# Patient Record
Sex: FEMALE | Race: WHITE
[De-identification: names, ages, dates, MRNs, and addresses within clinical notes are randomized per-mention and may not be internally consistent; named-entity substitution may affect disease eponyms.]

---

## 2017-01-12 ENCOUNTER — HOSPITAL ENCOUNTER (EMERGENCY)
Dept: HOSPITAL 10 - FTE | Age: 32
LOS: 1 days | Discharge: HOME | End: 2017-01-13
Payer: COMMERCIAL

## 2017-01-12 VITALS
HEIGHT: 68 IN | WEIGHT: 208.34 LBS | BODY MASS INDEX: 31.57 KG/M2 | WEIGHT: 208.34 LBS | HEIGHT: 68 IN | BODY MASS INDEX: 31.57 KG/M2

## 2017-01-12 DIAGNOSIS — Z85.850: ICD-10-CM

## 2017-01-12 DIAGNOSIS — E03.9: ICD-10-CM

## 2017-01-12 DIAGNOSIS — R10.13: Primary | ICD-10-CM

## 2017-01-12 DIAGNOSIS — R11.2: ICD-10-CM

## 2017-01-12 LAB
ALBUMIN SERPL-MCNC: 4.5 G/DL (ref 3.3–4.9)
ALBUMIN/GLOB SERPL: 1.36 {RATIO}
ALP SERPL-CCNC: 76 IU/L (ref 42–121)
ALT SERPL-CCNC: 146 IU/L (ref 13–69)
ANION GAP SERPL CALC-SCNC: 17 MMOL/L (ref 8–16)
APTT BLD: 30.9 SEC (ref 25–35)
AST SERPL-CCNC: 125 IU/L (ref 15–46)
BASOPHILS # BLD AUTO: 0 10^3/UL (ref 0–0.1)
BASOPHILS NFR BLD: 0.4 % (ref 0–2)
BILIRUB DIRECT SERPL-MCNC: 0 MG/DL (ref 0–0.2)
BILIRUB SERPL-MCNC: 1.3 MG/DL (ref 0.2–1.3)
BUN SERPL-MCNC: 12 MG/DL (ref 7–20)
CALCIUM SERPL-MCNC: 9.1 MG/DL (ref 8.4–10.2)
CHLORIDE SERPL-SCNC: 102 MMOL/L (ref 97–110)
CO2 SERPL-SCNC: 27 MMOL/L (ref 21–31)
CONDITION: 1
CREAT SERPL-MCNC: 1.05 MG/DL (ref 0.44–1)
EOSINOPHIL # BLD: 0.2 10^3/UL (ref 0–0.5)
EOSINOPHIL NFR BLD: 2.2 % (ref 0–7)
ERYTHROCYTE [DISTWIDTH] IN BLOOD BY AUTOMATED COUNT: 13.5 % (ref 11.5–14.5)
GLOBULIN SER-MCNC: 3.3 G/DL (ref 1.3–3.2)
GLUCOSE SERPL-MCNC: 89 MG/DL (ref 70–220)
HCT VFR BLD CALC: 40.7 % (ref 37–47)
HGB BLD-MCNC: 13.8 G/DL (ref 12–16)
INR PPP: 0.95
LYMPHOCYTES # BLD AUTO: 1.5 10^3/UL (ref 0.8–2.9)
LYMPHOCYTES NFR BLD AUTO: 21.6 % (ref 15–51)
MCH RBC QN AUTO: 32.1 PG (ref 29–33)
MCHC RBC AUTO-ENTMCNC: 33.9 G/DL (ref 32–37)
MCV RBC AUTO: 94.8 FL (ref 82–101)
MONOCYTES # BLD: 0.3 10^3/UL (ref 0.3–0.9)
MONOCYTES NFR BLD: 4.3 % (ref 0–11)
NEUTROPHILS # BLD: 5 10^3/UL (ref 1.6–7.5)
NEUTROPHILS NFR BLD AUTO: 71.5 % (ref 39–77)
NRBC # BLD MANUAL: 0 10^3/UL (ref 0–0)
NRBC BLD QL: 0 /100WBC (ref 0–0)
PLATELET # BLD: 285 10^3/UL (ref 140–440)
PMV BLD AUTO: 10 FL (ref 7.4–10.4)
POTASSIUM SERPL-SCNC: 3.8 MMOL/L (ref 3.5–5.1)
PROT SERPL-MCNC: 7.8 G/DL (ref 6.1–8.1)
PROTHROMBIN TIME: 12.7 SEC (ref 12.2–14.2)
PT RATIO: 1
RBC # BLD AUTO: 4.3 10^6/UL (ref 4.2–5.4)
SODIUM SERPL-SCNC: 142 MMOL/L (ref 135–144)
WBC # BLD AUTO: 7 10^3/UL (ref 4.8–10.8)
WBC # BLD: 7 10^3/UL (ref 4.8–10.8)

## 2017-01-12 PROCEDURE — 96374 THER/PROPH/DIAG INJ IV PUSH: CPT

## 2017-01-12 PROCEDURE — 83690 ASSAY OF LIPASE: CPT

## 2017-01-12 PROCEDURE — 84443 ASSAY THYROID STIM HORMONE: CPT

## 2017-01-12 PROCEDURE — 76705 ECHO EXAM OF ABDOMEN: CPT

## 2017-01-12 PROCEDURE — 81003 URINALYSIS AUTO W/O SCOPE: CPT

## 2017-01-12 PROCEDURE — 85730 THROMBOPLASTIN TIME PARTIAL: CPT

## 2017-01-12 PROCEDURE — 87086 URINE CULTURE/COLONY COUNT: CPT

## 2017-01-12 PROCEDURE — 81001 URINALYSIS AUTO W/SCOPE: CPT

## 2017-01-12 PROCEDURE — 84439 ASSAY OF FREE THYROXINE: CPT

## 2017-01-12 PROCEDURE — 96376 TX/PRO/DX INJ SAME DRUG ADON: CPT

## 2017-01-12 PROCEDURE — 36415 COLL VENOUS BLD VENIPUNCTURE: CPT

## 2017-01-12 PROCEDURE — 96361 HYDRATE IV INFUSION ADD-ON: CPT

## 2017-01-12 PROCEDURE — 85025 COMPLETE CBC W/AUTO DIFF WBC: CPT

## 2017-01-12 PROCEDURE — 96375 TX/PRO/DX INJ NEW DRUG ADDON: CPT

## 2017-01-12 PROCEDURE — 85610 PROTHROMBIN TIME: CPT

## 2017-01-12 PROCEDURE — 80053 COMPREHEN METABOLIC PANEL: CPT

## 2017-01-12 NOTE — ERD
ER Documentation


Chief Complaint


Date/Time


DATE: 1/12/17


Chief Complaint


Abdominal pain s/p food intake





HPI


The patient is a 31-year-old female who presents to the Emergency Department 

with complaint of epigastric abdominal pain, nausea and vomiting.  The patient 

reports that at 1:00 pm this afternoon, she ate lunch.  Shortly afterwards she 

noted onset of gradual-in-onset, now constant epigastric abdominal pain, which 

she currently rates as 5/10.  She reports associated burping, nausea and one 

episode of non-bilious, non-bloody emesis.  She denies any diarrhea. Denies 

black or bloody stools.  Denies vaginal bleeding or new vaginal discharge.  

Denies dysuria, hematuria, flank pain.  She has noted that over the past week 

after eating foods she has been burping more frequently, with associated 

nausea. 





Of note, the patient has a history of papillary thyroid carcinoma, and 

underwent complete thyroidectomy on 9/13/2016.  During her surgery, she was 

noted to have an affected lymph node, which was biopsied and removed.  Since, 

she has not been on any thyroid replacement therapy or synthroid.  This is 

secondary to the recommendation of the patient's thyroid specialist, as he 

plans to order a special study to confirm no spread of the patient's cancer.  

She was evaluated in the ED in 12/2016 for symptoms associated with her 

hypothyroid state, and was seen by an endocrinologist.  Patient has an 

appointment to follow up with her specialist this Monday for further evaluation 

and for the study to be performed.





ROS


All systems reviewed and are negative except as per history of present illness.





Medications


Home Meds


Active Scripts


Pantoprazole* (Protonix*) 40 Mg Tablet.dr, 40 MG PO BID, #20 TAB


   Prov:QUIN MIRAMONTES PA-C         1/13/17


Prochlorperazine* (Prochlorperazine*) 10 Mg Tablet, 5 MG PO Q6 Y for NAUSEA AND/

OR VOMITING, #12 TAB


   Prov:QUIN MIRAMONTES PA-C         1/13/17





Allergies


Allergies:  


Coded Allergies:  


     meperidine HCl (Verified  Allergy, Unknown, 9/13/16)


     metoclopramide HCl (Verified  Allergy, Unknown, 9/13/16)


     nickel (Verified  Allergy, Unknown, 12/28/16)





PMhx/Soc


History of Surgery:  Yes (CHOLECYSTECTOMY, left (benign) LUMPECTOMY,BTL, 

THYROIDECTOMY )


Anesthesia Reaction:  No


Hx Neurological Disorder:  No


Hx Respiratory Disorders:  No


Hx Cardiac Disorders:  No


Hx Psychiatric Problems:  Yes (DEPRESSION)


Hx Miscellaneous Medical Probl:  Yes (THYROID CA)


Hx Alcohol Use:  No


Hx Substance Use:  No


Hx Tobacco Use:  No


Smoking Status:  Never smoker





Physical Exam


Vitals





Vital Signs








  Date Time  Temp Pulse Resp B/P Pulse Ox O2 Delivery O2 Flow Rate FiO2


 


1/13/17 02:49   18 95/77 99 Room Air  


 


1/13/17 01:50 96.5 76 18 92/68 100 Room Air  


 


1/13/17 00:20 96.7 69 18 98/62 98 Room Air  


 


1/12/17 19:33 97.9 68 20 109/83 100   








Physical Exam


GENERAL: Well-developed, well-nourished, in no acute distress


HEENT: Head is normocephalic, atraumatic. No scleral pallor or icterus. Pupils 

equal, round and reactive to light. Conjunctiva pink. Moist mucous membranes.


NECK: Supple. No masses, no tenderness, no lymphadenopathy. Post-surgical scar 

noted. 


RESPIRATORY: Lungs are clear to auscultation bilaterally. No rales, rhonchi or 

wheezing.  Equal breath sounds.  Normal expiratory effort. 


CARDIOVASCULAR:  Regular rate and rhythm. S1 and S2 normal. No murmurs, rubs, 

or gallops.


GASTROINTESTINAL: Abdomen is soft and nondistended. Minimal tenderness to 

palpation over the epigastric region. No guarding, no rebound tenderness. 

Normal bowel sounds. No abdominal bruits. No gross peritonitis. No masses or 

organomegaly.  Negative Hoskins's sign. No tenderness at McBurney's point. 


FLANK: No CVA tenderness, no mass or swelling.


EXTREMITIES: No clubbing, cyanosis, or edema. Normal skin perfusion. Moving all 

extremities.  No focal swelling or erythema. Distal pulses are palpable, 2+ 

bilaterally. Capillary refill is less than 2 seconds.


NEUROLOGIC: The patient is alert, awake, and oriented x 3. No focal neurologic 

deficits. Speech is normal. 


INTEGUMENT:  Skin is clean, dry and intact. No rashes.


PSYCHIATRIC:  Appropriate; Cooperative.


Result Diagram:  


1/12/17 2100                                                                   

             1/12/17 2100





Results 24 hrs





 Laboratory Tests








Test


  1/12/17


21:00 1/12/17


23:28


 


Activated Partial


Thromboplast Time 30.9Sec 


  


 


 


Alanine Aminotransferase


(ALT/SGPT) 146IU/L 


  


 


 


Albumin 4.5g/dl  


 


Albumin/Globulin Ratio 1.36  


 


Alkaline Phosphatase 76IU/L  


 


Anion Gap 17  


 


Aspartate Amino Transf


(AST/SGOT) 125IU/L 


  


 


 


Basophils # 0.010^3/ul  


 


Basophils % 0.4%  


 


Blood Urea Nitrogen 12mg/dl  


 


Calcium Level 9.1mg/dl  


 


Carbon Dioxide Level 27mmol/L  


 


Chloride Level 102mmol/L  


 


Creatinine 1.05mg/dl  


 


Direct Bilirubin 0.00mg/dl  


 


Eosinophils # 0.210^3/ul  


 


Eosinophils % 2.2%  


 


Free Thyroxine < 0.07ng/dl  


 


Globulin 3.30g/dl  


 


Glucose Level 89mg/dl  


 


Hematocrit 40.7%  


 


Hemoglobin 13.8g/dl  


 


INR International Normalized


Ratio 0.95 


  


 


 


Indirect Bilirubin 1.3mg/dl  


 


Lipase 78U/L  


 


Lymphocytes # 1.510^3/ul  


 


Lymphocytes % 21.6%  


 


Mean Corpuscular Hemoglobin 32.1pg  


 


Mean Corpuscular Hemoglobin


Concent 33.9g/dl 


  


 


 


Mean Corpuscular Volume 94.8fl  


 


Mean Platelet Volume 10.0fl  


 


Monocytes # 0.310^3/ul  


 


Monocytes % 4.3%  


 


Neutrophils # 5.010^3/ul  


 


Neutrophils % 71.5%  


 


Nucleated Red Blood Cells # 0.010^3/ul  


 


Nucleated Red Blood Cells % 0.0/100WBC  


 


Platelet Count 39455^3/UL  


 


Potassium Level 3.8mmol/L  


 


Prothrombin Time 12.7Sec  


 


Prothrombin Time Ratio 1.0  


 


Red Blood Count 4.3010^6/ul  


 


Red Cell Distribution Width 13.5%  


 


Sodium Level 142mmol/L  


 


Thyroid Stimulating Hormone


(TSH) 171.000MIU/L 


  


 


 


Total Bilirubin 1.3mg/dl  


 


Total Protein 7.8g/dl  


 


White Blood Count 7.010^3/ul  


 


Urine Bacteria  OCCASIONAL 


 


Urine Bilirubin  NEGATIVE 


 


Urine Clarity  CLEAR 


 


Urine Color  YELLOW 


 


Urine Glucose  NEGATIVE% 


 


Urine Hemoglobin  NEGATIVE 


 


Urine Ketones  NEGATIVE 


 


Urine Leukocyte Esterase  TRACE 


 


Urine Microscopic RBC  NONE SEEN/HPF 


 


Urine Microscopic WBC  0-2/HPF 


 


Urine Mucus  FEW 


 


Urine Nitrite  NEGATIVE 


 


Urine Specific Gravity  1.025 


 


Urine Squamous Epithelial


Cells 


  FEW 


 


 


Urine Total Protein  NEGATIVE 


 


Urine Urobilinogen  1.0 E.U./dL 


 


Urine pH  6.0 








 Current Medications








 Medications


  (Trade)  Dose


 Ordered  Sig/Esperanza


 Route


 PRN Reason  Start Time


 Stop Time Status Last Admin


Dose Admin


 


 Sodium Chloride


  (NS)  1,000 ml @ 


 1,000 mls/hr  Q1H STAT


 IV


   1/12/17 20:43


 1/12/17 21:42 DC 1/12/17 20:59


 


 


 Morphine Sulfate


  (morphine)  4 mg  ONCE  STAT


 IV


   1/12/17 20:43


 1/12/17 20:45 DC 1/12/17 20:59


 


 


 Ondansetron HCl


  (Zofran Inj)  4 mg  ONCE  STAT


 IV


   1/12/17 20:43


 1/12/17 20:45 DC 1/12/17 20:59


 


 


 Famotidine


  (Pepcid Iv)  20 mg  ONCE  ONCE


 IV


   1/12/17 21:00


 1/12/17 21:01 DC 1/12/17 20:59


 


 


 Ketorolac


 Tromethamine


  (Toradol)  30 mg  ONCE  STAT


 IV


   1/12/17 21:43


 1/12/17 21:44 DC 1/12/17 21:53


 


 


 Hydromorphone HCl


  (Dilaudid)  0.5 mg  ONCE  STAT


 IV


   1/12/17 23:17


 1/12/17 23:18 DC 1/12/17 23:25


 


 


 Ondansetron HCl


  (Zofran Inj)  4 mg  ONCE  STAT


 IV


   1/12/17 23:17


 1/12/17 23:18 DC 1/12/17 23:25


 


 


 Miscellaneous


 Medication


  (Gi Cocktail (2))  40 ml  ONCE  STAT


 PO


   1/13/17 00:08


 1/13/17 00:09 DC 1/13/17 00:16


 


 


 Prochlorperazine


 10 mg  10 mg  ONCE  ONCE


 IV


   1/13/17 01:30


 1/13/17 01:31 DC 1/13/17 01:39


 


 


 Sodium Chloride


  (NS)  500 ml @ 


 500 mls/hr  Q1H STAT


 IV


   1/13/17 01:51


 1/13/17 02:50 DC 1/13/17 01:56


 











Procedures/MDM


DIAGNOSTIC TESTS AND INTERPRETATION:


PROCEDURE:   US Abdomen. 


CLINICAL INDICATION:   abdominal pain 


TECHNIQUE:   Multiple real-time images were acquired of the patient's right 

upper quadrant abdomen and retroperitoneum utilizing a high resolution 

transducer. 


COMPARISON:   None 


FINDINGS:


The liver demonstrates normal echogenicity.  The liver is normal in size and no 

focal solid lesions are seen. The liver measures 16.3 cm in length. The portal 

vein is patent with normal direction of flow.  No intrahepatic biliary 

dilatation is seen. 


The patient is status post cholecystectomy. The common bile duct measures 5 mm 

in maximal dimension.  


The pancreas was not seen due to overlying bowel gas.


No free fluid is identified.


The right kidney is normal in size, and demonstrate normal echogenicity and 

cortical thickness.  The right kidney measures 10 cm in long dimension.  There 

is no evidence of hydronephrosis. There are no kidney stones.  


IMPRESSION:Status post cholecystectomy. Peristalsing bowel seen in the 

gallbladder fossa. Pancreas not visualized.


_____________________________________________ 


.Perfecto Klein MD, MD           Date    Time 


Electronically viewed and signed by .Perfecto Klein MD, MD on 01/12/2017 21:

36 





EMERGENCY DEPARTMENT COURSE: The patient was stable throughout the ED course. 

IV access established by nursing staff. Laboratory work and diagnostic imaging 

was performed. On reevaluation the patient reports decreased pain and symptoms. 





Discussed case with Dr. Newell, ED supervising physician, who evaluated the 

patient bedside.  Recommends against CT imaging at this time, as risks of 

radiation outweigh current possible benefits.  Additionally, patient has 

upcoming study involving radiation.  Discussed recommendation against CT 

imaging with the patient, who agrees with plan.  Dr. Newell recommends discharge 

home with rx for Compazine 5 mg PO q6hrs PRN nausea, and Protonix 40 mg PO BID 

PRN, and that patient follow up with GI specialist for endoscopy. Patient's 

thyroid levels consistent with that of prior. Patient will follow up with 

thyroid specialist on Monday to continue with planned scan and therapy. 





MEDICAL DECISION MAKING:  This is a 31-year-old female presenting to the 

Emergency Department with epigastric abdominal pain, nausea and vomiting which 

began after eating a meal at 1:00 am.  On physical examination, the patient had 

minimal tenderness to palpation over the epigastrium, but otherwise vital signs 

were stable.  Differential diagnosis includes, but is not limited to, 

gastroenteritis, gastritis, cholecystitis, cholangitis, choledocholithiasis, 

pancreatitis, perforated viscus, mesenteric ischemia, GERD, PUD, urinary tract 

infection, acute coronary syndrome, pyelonephritis, pneumonia, hepatitis, 

infectious diarrhea, IBD, aortic dissection, torsion, bowel obstruction, 

appendicitis, diverticulitis.  After rest and administration of fluids and 

medications, the patient reports no new complaints and decreased pain and 

symptoms.  





Upon review and interpretation of the patient's presentation and overall ER 

course, I believe the patient's symptoms are most consistent with epigastric 

abdominal pain, nausea and vomiting, uncertain etiology.  I doubt cholecystitis

, no abnormalities or indication of disease process noted on ultrasound, 

negative Hoskins's sign, and patient has history of cholecystectomy.  Patient's 

indirect bilirubin 1.3, , , however, no dilated CBD on ultrasound 

imaging, no RUQ tenderness, no leukocytosis, and therefore I doubt 

choledocholithiasis at this time. (This was discussed with Dr. Newell, who agrees

) Doubt acute coronary syndrome - symptoms and examination inconsistent.  Doubt 

pancreatitis - clinical presentation inconsistent.  Doubt perforated ulcer, 

patient has a non-surgical abdomen.  Doubt small bowel obstruction, patient is 

passing flatus, abdomen is non-distended.  Doubt appendicitis, patient has no 

McBurney's point tenderness, no guarding, non-surgical abdomen, no tenderness 

over the RLQ.  Doubt diverticulitis, exam inconsistent.  Doubt ischemic bowel, 

no pain out of proportion to examination.  Doubt torsion, symptoms and 

examination inconsistent.





At this time, the patient is in stable condition with stable vital signs and 

therefore can be discharged home with prescriptions for Compazine and Protonix, 

and strict return precautions for signs of deteriorating or worsening 

condition.  





The patient is noted to be significantly hypothyroid, with , T4 <0.07, 

though consistent with values of previous presentation.  This status is already 

known to patient, and she has an upcoming appointment with her thyroid 

specialist on Monday for further evaluation.  





She is advised to follow up with her primary care provider/endocrinologist and 

a GI specialist in 2-3 days for reevaluation and further management, or return 

to the ER sooner if symptoms worsen.  I shared my medical decision making, plan

, as well as the results with the patient at length and in great detail, and 

she verbally understands and agrees with the plan for further observation and 

care as an outpatient.  At the time of discharge, all questions were answered.





Departure


Diagnosis:  


 Primary Impression:  


 Epigastric abdominal pain


 Additional Impressions:  


 Nausea and vomiting


 Vomiting type:  unspecified  Vomiting Intractability:  non-intractable  

Qualified Code:  R11.2 - Non-intractable vomiting with nausea, unspecified 

vomiting type


 Hypothyroidism


 Hypothyroidism type:  unspecified  Qualified Code:  E03.9 - Hypothyroidism, 

unspecified type


Condition:  Stable


Patient Instructions:  Epigastric Pain (Uncertain Cause), Nausea and Vomiting-

Adult





Additional Instructions:  


Follow up with your primary medical provider within 2-3 days for reevaluation 

and further management.  You will also need to see your thyroid specialist, 

endocrinologist and a GI specialist (gastroenterologist) for further 

evaluation. Return to the ED sooner for any new or worsening symptoms.











QUIN MIRAMONTES PA-C Jan 12, 2017 21:45

## 2017-01-12 NOTE — RADRPT
PROCEDURE:   US Abdomen. 

 

CLINICAL INDICATION:   abdominal pain 

 

TECHNIQUE:   Multiple real-time images were acquired of the patient's right upper quadrant abdomen a
nd retroperitoneum utilizing a high resolution transducer. 

 

COMPARISON:   None 

 

FINDINGS:

 

The liver demonstrates normal echogenicity.  The liver is normal in size and no focal solid lesions 
are seen. The liver measures 16.3 cm in length. The portal vein is patent with normal direction of f
low.  No intrahepatic biliary dilatation is seen. 

 

The patient is status post cholecystectomy. The common bile duct measures 5 mm in maximal dimension.
  

 

The pancreas was not seen due to overlying bowel gas.

 

No free fluid is identified.

 

The right kidney is normal in size, and demonstrate normal echogenicity and cortical thickness.  The
 right kidney measures 10 cm in long dimension.  There is no evidence of hydronephrosis. There are n
o kidney stones.  

 

 

RPTAT: AA

 

IMPRESSION:

 

Status post cholecystectomy.

Peristalsing bowel seen in the gallbladder fossa.

Pancreas not visualized.

_____________________________________________ 

.Perfecto Klein MD, MD           Date    Time 

Electronically viewed and signed by .Perfecto Klein MD, MD on 01/12/2017 21:36 

 

D:  01/12/2017 21:36  T:  01/12/2017 21:36

.S/

## 2017-01-13 VITALS — RESPIRATION RATE: 18 BRPM | SYSTOLIC BLOOD PRESSURE: 95 MMHG | DIASTOLIC BLOOD PRESSURE: 77 MMHG

## 2017-01-13 VITALS — TEMPERATURE: 96.5 F | HEART RATE: 76 BPM

## 2017-01-13 LAB
ADD UMIC: YES
BACTERIA #/AREA URNS HPF: (no result) /[HPF]
COLOR UR: YELLOW
GLUCOSE UR STRIP-MCNC: NEGATIVE %
KETONES UR STRIP.AUTO-MCNC: NEGATIVE MG/DL
MUCOUS THREADS #/AREA URNS HPF: (no result) /[HPF]
NITRITE UR QL STRIP.AUTO: NEGATIVE
RBC # UR AUTO: NEGATIVE /UL
RBC #/AREA URNS HPF: (no result) /HPF
SQUAMOUS #/AREA URNS HPF: (no result) /[HPF]
URINE BILIRUBIN (DIP): NEGATIVE
URINE TOTAL PROTEIN (DIP): NEGATIVE
UROBILINOGEN UR STRIP-ACNC: (no result) (ref 0.1–1)
WBC # UR STRIP: (no result) /UL

## 2017-07-12 ENCOUNTER — HOSPITAL ENCOUNTER (INPATIENT)
Dept: HOSPITAL 10 - MS1 | Age: 32
LOS: 2 days | Discharge: HOME | DRG: 603 | End: 2017-07-14
Attending: FAMILY MEDICINE | Admitting: FAMILY MEDICINE
Payer: COMMERCIAL

## 2017-07-12 VITALS
HEIGHT: 69 IN | WEIGHT: 209.44 LBS | WEIGHT: 209.44 LBS | HEIGHT: 69 IN | BODY MASS INDEX: 31.02 KG/M2 | BODY MASS INDEX: 31.02 KG/M2

## 2017-07-12 VITALS — SYSTOLIC BLOOD PRESSURE: 131 MMHG | DIASTOLIC BLOOD PRESSURE: 74 MMHG | RESPIRATION RATE: 19 BRPM

## 2017-07-12 DIAGNOSIS — L03.221: Primary | ICD-10-CM

## 2017-07-12 DIAGNOSIS — Z90.49: ICD-10-CM

## 2017-07-12 DIAGNOSIS — R59.0: ICD-10-CM

## 2017-07-12 DIAGNOSIS — Z98.51: ICD-10-CM

## 2017-07-12 DIAGNOSIS — E89.0: ICD-10-CM

## 2017-07-12 DIAGNOSIS — F32.9: ICD-10-CM

## 2017-07-12 DIAGNOSIS — Z85.850: ICD-10-CM

## 2017-07-12 DIAGNOSIS — E66.9: ICD-10-CM

## 2017-07-12 LAB
ADD SCAN DIFF: NO
ANION GAP SERPL CALC-SCNC: 9 MMOL/L (ref 8–16)
BASOPHILS # BLD AUTO: 0 10^3/UL (ref 0–0.1)
BASOPHILS NFR BLD: 0.6 % (ref 0–2)
BUN SERPL-MCNC: 14 MG/DL (ref 7–20)
CALCIUM SERPL-MCNC: 8.9 MG/DL (ref 8.4–10.2)
CHLORIDE SERPL-SCNC: 103 MMOL/L (ref 97–110)
CO2 SERPL-SCNC: 24 MMOL/L (ref 21–31)
CREAT SERPL-MCNC: 0.7 MG/DL (ref 0.44–1)
EOSINOPHIL # BLD: 0.2 10^3/UL (ref 0–0.5)
EOSINOPHIL NFR BLD: 2.9 % (ref 0–7)
ERYTHROCYTE [DISTWIDTH] IN BLOOD BY AUTOMATED COUNT: 11.5 % (ref 11.5–14.5)
GLUCOSE SERPL-MCNC: 92 MG/DL (ref 70–220)
HCT VFR BLD CALC: 34.7 % (ref 37–47)
HGB BLD-MCNC: 11.5 G/DL (ref 12–16)
LYMPHOCYTES # BLD AUTO: 1.2 10^3/UL (ref 0.8–2.9)
LYMPHOCYTES NFR BLD AUTO: 21.5 % (ref 15–51)
MCH RBC QN AUTO: 30.3 PG (ref 29–33)
MCHC RBC AUTO-ENTMCNC: 33.1 G/DL (ref 32–37)
MCV RBC AUTO: 91.3 FL (ref 82–101)
MONOCYTES # BLD: 0.8 10^3/UL (ref 0.3–0.9)
MONOCYTES NFR BLD: 14.5 % (ref 0–11)
NEUTROPHILS # BLD: 3.3 10^3/UL (ref 1.6–7.5)
NEUTROPHILS NFR BLD AUTO: 60.3 % (ref 39–77)
NRBC # BLD MANUAL: 0 10^3/UL (ref 0–0)
NRBC BLD QL: 0 /100WBC (ref 0–0)
PLATELET # BLD: 261 10^3/UL (ref 140–415)
PMV BLD AUTO: 10 FL (ref 7.4–10.4)
POTASSIUM SERPL-SCNC: 3.8 MMOL/L (ref 3.5–5.1)
RBC # BLD AUTO: 3.8 10^6/UL (ref 4.2–5.4)
SODIUM SERPL-SCNC: 132 MMOL/L (ref 135–144)
WBC # BLD AUTO: 5.4 10^3/UL (ref 4.8–10.8)

## 2017-07-12 PROCEDURE — 80048 BASIC METABOLIC PNL TOTAL CA: CPT

## 2017-07-12 PROCEDURE — 84481 FREE ASSAY (FT-3): CPT

## 2017-07-12 PROCEDURE — 85025 COMPLETE CBC W/AUTO DIFF WBC: CPT

## 2017-07-12 PROCEDURE — 84443 ASSAY THYROID STIM HORMONE: CPT

## 2017-07-12 PROCEDURE — 84432 ASSAY OF THYROGLOBULIN: CPT

## 2017-07-12 PROCEDURE — 80202 ASSAY OF VANCOMYCIN: CPT

## 2017-07-12 PROCEDURE — 84439 ASSAY OF FREE THYROXINE: CPT

## 2017-07-12 PROCEDURE — 87081 CULTURE SCREEN ONLY: CPT

## 2017-07-12 PROCEDURE — 86800 THYROGLOBULIN ANTIBODY: CPT

## 2017-07-12 PROCEDURE — 82533 TOTAL CORTISOL: CPT

## 2017-07-12 PROCEDURE — 80053 COMPREHEN METABOLIC PANEL: CPT

## 2017-07-12 PROCEDURE — C9113 INJ PANTOPRAZOLE SODIUM, VIA: HCPCS

## 2017-07-12 RX ADMIN — FOLIC ACID SCH MLS/HR: 5 INJECTION, SOLUTION INTRAMUSCULAR; INTRAVENOUS; SUBCUTANEOUS at 21:20

## 2017-07-13 VITALS — DIASTOLIC BLOOD PRESSURE: 68 MMHG | RESPIRATION RATE: 14 BRPM | SYSTOLIC BLOOD PRESSURE: 118 MMHG

## 2017-07-13 VITALS — RESPIRATION RATE: 20 BRPM | SYSTOLIC BLOOD PRESSURE: 121 MMHG | DIASTOLIC BLOOD PRESSURE: 70 MMHG

## 2017-07-13 LAB
ADD SCAN DIFF: NO
ALBUMIN SERPL-MCNC: 3.9 G/DL (ref 3.3–4.9)
ALBUMIN/GLOB SERPL: 1.69 {RATIO}
ALP SERPL-CCNC: 81 IU/L (ref 42–121)
ALT SERPL-CCNC: 53 IU/L (ref 13–69)
ANION GAP SERPL CALC-SCNC: 9 MMOL/L (ref 8–16)
AST SERPL-CCNC: 36 IU/L (ref 15–46)
BASOPHILS # BLD AUTO: 0 10^3/UL (ref 0–0.1)
BASOPHILS NFR BLD: 0.5 % (ref 0–2)
BILIRUB DIRECT SERPL-MCNC: 0 MG/DL (ref 0–0.2)
BILIRUB SERPL-MCNC: 0.5 MG/DL (ref 0.2–1.3)
BUN SERPL-MCNC: 10 MG/DL (ref 7–20)
CALCIUM SERPL-MCNC: 8.3 MG/DL (ref 8.4–10.2)
CHLORIDE SERPL-SCNC: 106 MMOL/L (ref 97–110)
CO2 SERPL-SCNC: 24 MMOL/L (ref 21–31)
CREAT SERPL-MCNC: 0.76 MG/DL (ref 0.44–1)
EOSINOPHIL # BLD: 0.1 10^3/UL (ref 0–0.5)
EOSINOPHIL NFR BLD: 2.4 % (ref 0–7)
ERYTHROCYTE [DISTWIDTH] IN BLOOD BY AUTOMATED COUNT: 11.4 % (ref 11.5–14.5)
GLOBULIN SER-MCNC: 2.3 G/DL (ref 1.3–3.2)
GLUCOSE SERPL-MCNC: 92 MG/DL (ref 70–220)
HCT VFR BLD CALC: 33.9 % (ref 37–47)
HGB BLD-MCNC: 11.1 G/DL (ref 12–16)
LYMPHOCYTES # BLD AUTO: 1 10^3/UL (ref 0.8–2.9)
LYMPHOCYTES NFR BLD AUTO: 16.4 % (ref 15–51)
MCH RBC QN AUTO: 29.9 PG (ref 29–33)
MCHC RBC AUTO-ENTMCNC: 32.7 G/DL (ref 32–37)
MCV RBC AUTO: 91.4 FL (ref 82–101)
MONOCYTES # BLD: 0.5 10^3/UL (ref 0.3–0.9)
MONOCYTES NFR BLD: 9.1 % (ref 0–11)
NEUTROPHILS # BLD: 4.2 10^3/UL (ref 1.6–7.5)
NEUTROPHILS NFR BLD AUTO: 71.4 % (ref 39–77)
NRBC # BLD MANUAL: 0 10^3/UL (ref 0–0)
NRBC BLD QL: 0 /100WBC (ref 0–0)
PLATELET # BLD: 256 10^3/UL (ref 140–415)
PMV BLD AUTO: 10.3 FL (ref 7.4–10.4)
POTASSIUM SERPL-SCNC: 4.1 MMOL/L (ref 3.5–5.1)
PROT SERPL-MCNC: 6.2 G/DL (ref 6.1–8.1)
RBC # BLD AUTO: 3.71 10^6/UL (ref 4.2–5.4)
SODIUM SERPL-SCNC: 135 MMOL/L (ref 135–144)
T3FREE SERPL-MCNC: 3.41 PG/ML (ref 2.77–5.27)
TSH SERPL-ACNC: 0.03 MIU/L (ref 0.47–4.68)
WBC # BLD AUTO: 5.9 10^3/UL (ref 4.8–10.8)

## 2017-07-13 RX ADMIN — AMPICILLIN SODIUM AND SULBACTAM SODIUM SCH MLS/HR: 1; .5 INJECTION, POWDER, FOR SOLUTION INTRAMUSCULAR; INTRAVENOUS at 05:36

## 2017-07-13 RX ADMIN — VANCOMYCIN HYDROCHLORIDE SCH MLS/HR: 1 INJECTION, POWDER, LYOPHILIZED, FOR SOLUTION INTRAVENOUS at 18:06

## 2017-07-13 RX ADMIN — KETOROLAC TROMETHAMINE PRN MG: 15 INJECTION, SOLUTION INTRAMUSCULAR; INTRAVENOUS at 00:17

## 2017-07-13 RX ADMIN — FOLIC ACID SCH MLS/HR: 5 INJECTION, SOLUTION INTRAMUSCULAR; INTRAVENOUS; SUBCUTANEOUS at 06:41

## 2017-07-13 RX ADMIN — KETOROLAC TROMETHAMINE PRN MG: 15 INJECTION, SOLUTION INTRAMUSCULAR; INTRAVENOUS at 10:44

## 2017-07-13 RX ADMIN — AMPICILLIN SODIUM AND SULBACTAM SODIUM SCH MLS/HR: 1; .5 INJECTION, POWDER, FOR SOLUTION INTRAMUSCULAR; INTRAVENOUS at 01:05

## 2017-07-13 RX ADMIN — AMPICILLIN SODIUM AND SULBACTAM SODIUM SCH MLS/HR: 1; .5 INJECTION, POWDER, FOR SOLUTION INTRAMUSCULAR; INTRAVENOUS at 12:43

## 2017-07-13 RX ADMIN — VANCOMYCIN HYDROCHLORIDE SCH MLS/HR: 1 INJECTION, POWDER, LYOPHILIZED, FOR SOLUTION INTRAVENOUS at 10:41

## 2017-07-13 RX ADMIN — KETOROLAC TROMETHAMINE PRN MG: 15 INJECTION, SOLUTION INTRAMUSCULAR; INTRAVENOUS at 23:53

## 2017-07-13 RX ADMIN — KETOROLAC TROMETHAMINE PRN MG: 15 INJECTION, SOLUTION INTRAMUSCULAR; INTRAVENOUS at 17:34

## 2017-07-13 RX ADMIN — AMPICILLIN SODIUM AND SULBACTAM SODIUM SCH MLS/HR: 1; .5 INJECTION, POWDER, FOR SOLUTION INTRAMUSCULAR; INTRAVENOUS at 17:32

## 2017-07-13 RX ADMIN — PANTOPRAZOLE SODIUM SCH MG: 40 TABLET, DELAYED RELEASE ORAL at 17:31

## 2017-07-13 RX ADMIN — AMPICILLIN SODIUM AND SULBACTAM SODIUM SCH MLS/HR: 1; .5 INJECTION, POWDER, FOR SOLUTION INTRAMUSCULAR; INTRAVENOUS at 23:53

## 2017-07-13 RX ADMIN — FOLIC ACID SCH MLS/HR: 5 INJECTION, SOLUTION INTRAMUSCULAR; INTRAVENOUS; SUBCUTANEOUS at 10:42

## 2017-07-13 NOTE — PN
Date/Time of Note


Date/Time of Note


DATE: 7/13/17 


TIME: 13:40





Assessment/Plan


VTE Prophylaxis


VTE Prophylaxis Intervention:  SCD's





Lines/Catheters


IV Catheter Type (from Nrs):  Peripheral IV





Assessment/Plan


Chief Complaint/Hosp Course


Assessment and plan





#1 neck cellulitis: There is no CT evidence of abscess or fluid collection.  


   At the current time we will treat with IV vancomycin and Unasyn.  


   Patient is speaking and swallowing without any difficulty will put her on a 

regular diet at this time.  


   Toradol for pain.  


   ENT has been consulted will follow up his recommendation for further studies 

and management





#2 hypothyroidism: Patient is status post thyroidectomy secondary to her 

thyroid cancer.  


   Her TSH done at the outside facility was 0.07.  


   Follow-up free T3 and free T4 


   Patient has an appointment with her endocrinologist in the coming weeks.  


   At the current time we will continue her home dose of thyroid medication, 

her medication is in not formulary therefore patient may continue her home 

medication. 


   Consult endocrinologist





#3 depression: We will continue patient's home dose of Cymbalta.





#4 DVT and GI prophylaxis: SCDs, Protonix





Further treatment strategy will be implemented as per the clinical course


Problems:  





Subjective


24 Hr Interval Summary


Free Text/Dictation


Patient continues to complain of having left sided neck pain


Denies any chest pain or shortness of breath


Denies of any headache or dizziness





Exam/Review of Systems


Vital Signs


Vitals





 Vital Signs








  Date Time  Temp Pulse Resp B/P Pulse Ox O2 Delivery O2 Flow Rate FiO2


 


7/13/17 08:30 99.5 92 14 118/68 99   














 Intake and Output   


 


 7/12/17 7/12/17 7/13/17





 15:00 23:00 07:00


 


Intake Total   1580 ml


 


Balance   1580 ml











Exam


General: The patient is well-developed, Not  in acute distress.  Moderately 

overweight


HEENT: Atraumatic, normocephalic. The pupils are equal and round .


 Neck: Palpable mass at the left cervical region which is tender to touch


Chest: Normal expansion of the thorax during inspiration


Lungs: Clear to auscultation bilaterally


Heart: Normal S1-S2, Regular rhythm and rate.


Abdomen: Soft , nontender,  nondistended , bowel sounds are present.


Extremities: Normal to inspection, no edema no cyanosis


Neurologic: Normal mental status,The patient is awake,  alert and oriented .





Results


Result Diagram:  


7/13/17 0510                                                                   

             7/13/17 0510





Results 24 hrs





Laboratory Tests








Test


  7/12/17


22:21 7/13/17


05:10


 


Sodium Level 132  L 135  


 


Potassium Level 3.8   4.1  


 


Chloride Level 103   106  


 


Carbon Dioxide Level 24   24  


 


Anion Gap 9   9  


 


Blood Urea Nitrogen 14   10  


 


Creatinine 0.70   0.76  


 


Glucose Level 92   92  


 


Calcium Level 8.9   8.3  L


 


White Blood Count  5.9  


 


Red Blood Count  3.71  L


 


Hemoglobin  11.1  L


 


Hematocrit  33.9  L


 


Mean Corpuscular Volume  91.4  


 


Mean Corpuscular Hemoglobin  29.9  


 


Mean Corpuscular Hemoglobin


Concent 


  32.7  


 


 


Red Cell Distribution Width  11.4  L


 


Platelet Count  256  


 


Mean Platelet Volume  10.3  


 


Neutrophils %  71.4  


 


Lymphocytes %  16.4  


 


Monocytes %  9.1  


 


Eosinophils %  2.4  


 


Basophils %  0.5  


 


Nucleated Red Blood Cells %  0.0  


 


Neutrophils #  4.2  


 


Lymphocytes #  1.0  


 


Monocytes #  0.5  


 


Eosinophils #  0.1  


 


Basophils #  0.0  


 


Nucleated Red Blood Cells #  0.0  


 


Total Bilirubin  0.5  


 


Direct Bilirubin  0.00  


 


Indirect Bilirubin  0.5  


 


Aspartate Amino Transf


(AST/SGOT) 


  36  


 


 


Alanine Aminotransferase


(ALT/SGPT) 


  53  


 


 


Alkaline Phosphatase  81  


 


Total Protein  6.2  


 


Albumin  3.9  


 


Globulin  2.30  


 


Albumin/Globulin Ratio  1.69  











Medications


Medications





 Current Medications


Sodium Chloride (NS) 1,000 ml @  100 mls/hr Q10H IV  Last administered on 7/13/ 17at 10:42; Admin Dose 100 MLS/HR;  Start 7/12/17 at 20:41


Ondansetron HCl (Zofran Inj) 4 mg Q6H  PRN IV NAUSEA AND/OR VOMITING;  Start 7/ 12/17 at 21:00


Morphine Sulfate (morphine) 2 mg Q4H  PRN IV SEVERE PAIN LEVEL 7-10 Last 

administered on 7/12/17at 21:27; Admin Dose 2 MG;  Start 7/12/17 at 21:00


Pantoprazole 40 mg 40 mg DAILY@06 IV  Last administered on 7/13/17at 05:29; 

Admin Dose 40 MG;  Start 7/13/17 at 06:00


Ampicillin Sodium/ Sulbactam Sodium (Unasyn 1.5gm/NS (Pmx)) 50 ml @  100 mls/hr 

Q6 IVPB  Last administered on 7/13/17at 12:43; Admin Dose 100 MLS/HR;  Start 7/ 13/17 at 00:00


Ketorolac Tromethamine 15 mg 15 mg Q6H  PRN IV PAIN Last administered on 7/13/ 17at 10:44; Admin Dose 15 MG;  Start 7/13/17 at 00:00;  Stop 7/14/17 at 23:59


Vancomycin HCl (Vancocin) 250 ml @  125 mls/hr Q8H IVPB  Last administered on 7/ 13/17at 10:41; Admin Dose 125 MLS/HR;  Start 7/13/17 at 10:00


Miscellaneous Information (*Rx Drug Level Order Reminder*) VANCO TROUGH @  0,

100 ON... ONCE  ONCE XX ;  Start 7/14/17 at 01:00;  Stop 7/14/17 at 01:01


Diphenhydramine HCl (Benadryl) 25 mg Q6H  PRN PO ITCHING Last administered on 7/ 13/17at 10:42; Admin Dose 25 MG;  Start 7/13/17 at 10:00











LONI RYAN MD Jul 13, 2017 13:45

## 2017-07-14 VITALS — SYSTOLIC BLOOD PRESSURE: 119 MMHG | RESPIRATION RATE: 16 BRPM | DIASTOLIC BLOOD PRESSURE: 67 MMHG

## 2017-07-14 LAB
ADD SCAN DIFF: NO
ALBUMIN SERPL-MCNC: 4 G/DL (ref 3.3–4.9)
ALBUMIN/GLOB SERPL: 1.6 {RATIO}
ALP SERPL-CCNC: 103 IU/L (ref 42–121)
ALT SERPL-CCNC: 58 IU/L (ref 13–69)
ANION GAP SERPL CALC-SCNC: 7 MMOL/L (ref 8–16)
AST SERPL-CCNC: 35 IU/L (ref 15–46)
BASOPHILS # BLD AUTO: 0 10^3/UL (ref 0–0.1)
BASOPHILS NFR BLD: 0.5 % (ref 0–2)
BILIRUB DIRECT SERPL-MCNC: 0 MG/DL (ref 0–0.2)
BILIRUB SERPL-MCNC: 0.4 MG/DL (ref 0.2–1.3)
BUN SERPL-MCNC: 9 MG/DL (ref 7–20)
CALCIUM SERPL-MCNC: 8.9 MG/DL (ref 8.4–10.2)
CHLORIDE SERPL-SCNC: 105 MMOL/L (ref 97–110)
CO2 SERPL-SCNC: 24 MMOL/L (ref 21–31)
CREAT SERPL-MCNC: 0.67 MG/DL (ref 0.44–1)
EOSINOPHIL # BLD: 0.1 10^3/UL (ref 0–0.5)
EOSINOPHIL NFR BLD: 1.1 % (ref 0–7)
ERYTHROCYTE [DISTWIDTH] IN BLOOD BY AUTOMATED COUNT: 11 % (ref 11.5–14.5)
GLOBULIN SER-MCNC: 2.5 G/DL (ref 1.3–3.2)
GLUCOSE SERPL-MCNC: 103 MG/DL (ref 70–220)
HCT VFR BLD CALC: 34.9 % (ref 37–47)
HGB BLD-MCNC: 11.9 G/DL (ref 12–16)
LYMPHOCYTES # BLD AUTO: 0.7 10^3/UL (ref 0.8–2.9)
LYMPHOCYTES NFR BLD AUTO: 9.1 % (ref 15–51)
MCH RBC QN AUTO: 30.7 PG (ref 29–33)
MCHC RBC AUTO-ENTMCNC: 34.1 G/DL (ref 32–37)
MCV RBC AUTO: 90.2 FL (ref 82–101)
MONOCYTES # BLD: 0.5 10^3/UL (ref 0.3–0.9)
MONOCYTES NFR BLD: 6.3 % (ref 0–11)
NEUTROPHILS # BLD: 6.6 10^3/UL (ref 1.6–7.5)
NEUTROPHILS NFR BLD AUTO: 82.6 % (ref 39–77)
NRBC # BLD MANUAL: 0 10^3/UL (ref 0–0)
NRBC BLD QL: 0 /100WBC (ref 0–0)
PLATELET # BLD: 292 10^3/UL (ref 140–415)
PMV BLD AUTO: 10.1 FL (ref 7.4–10.4)
POTASSIUM SERPL-SCNC: 3.5 MMOL/L (ref 3.5–5.1)
PROT SERPL-MCNC: 6.5 G/DL (ref 6.1–8.1)
RBC # BLD AUTO: 3.87 10^6/UL (ref 4.2–5.4)
SODIUM SERPL-SCNC: 132 MMOL/L (ref 135–144)
T3FREE SERPL-MCNC: 5.02 PG/ML (ref 2.77–5.27)
TSH SERPL-ACNC: 0.03 MIU/L (ref 0.47–4.68)
WBC # BLD AUTO: 8 10^3/UL (ref 4.8–10.8)

## 2017-07-14 RX ADMIN — VANCOMYCIN HYDROCHLORIDE SCH MLS/HR: 1 INJECTION, POWDER, LYOPHILIZED, FOR SOLUTION INTRAVENOUS at 02:56

## 2017-07-14 RX ADMIN — PANTOPRAZOLE SODIUM SCH MG: 40 TABLET, DELAYED RELEASE ORAL at 06:12

## 2017-07-14 RX ADMIN — AMPICILLIN SODIUM AND SULBACTAM SODIUM SCH MLS/HR: 1; .5 INJECTION, POWDER, FOR SOLUTION INTRAMUSCULAR; INTRAVENOUS at 17:18

## 2017-07-14 RX ADMIN — VANCOMYCIN HYDROCHLORIDE SCH MLS/HR: 1 INJECTION, POWDER, LYOPHILIZED, FOR SOLUTION INTRAVENOUS at 17:18

## 2017-07-14 RX ADMIN — FOLIC ACID SCH MLS/HR: 5 INJECTION, SOLUTION INTRAMUSCULAR; INTRAVENOUS; SUBCUTANEOUS at 12:21

## 2017-07-14 RX ADMIN — VANCOMYCIN HYDROCHLORIDE SCH MLS/HR: 1 INJECTION, POWDER, LYOPHILIZED, FOR SOLUTION INTRAVENOUS at 10:13

## 2017-07-14 RX ADMIN — FOLIC ACID SCH MLS/HR: 5 INJECTION, SOLUTION INTRAMUSCULAR; INTRAVENOUS; SUBCUTANEOUS at 01:07

## 2017-07-14 RX ADMIN — AMPICILLIN SODIUM AND SULBACTAM SODIUM SCH MLS/HR: 1; .5 INJECTION, POWDER, FOR SOLUTION INTRAMUSCULAR; INTRAVENOUS at 11:42

## 2017-07-14 RX ADMIN — AMPICILLIN SODIUM AND SULBACTAM SODIUM SCH MLS/HR: 1; .5 INJECTION, POWDER, FOR SOLUTION INTRAMUSCULAR; INTRAVENOUS at 06:12

## 2017-07-14 RX ADMIN — PANTOPRAZOLE SODIUM SCH MG: 40 TABLET, DELAYED RELEASE ORAL at 17:18

## 2017-07-14 RX ADMIN — KETOROLAC TROMETHAMINE PRN MG: 15 INJECTION, SOLUTION INTRAMUSCULAR; INTRAVENOUS at 11:42

## 2017-07-14 NOTE — PDOCDIS
Discharge Instructions


CONDITION


Patient Condition:  Good





HOME CARE INSTRUCTIONS:


Diet Instructions:  Regular





ACTIVITY:








Activity Restrictions:   No Restrictions











FOLLOW UP/APPOINTMENTS


Follow-up Plan


F/U WITH YOUR PCP AND ENDOCRINOLOGIST AS SCHEDULED











TALHA BROWN Jul 14, 2017 15:06

## 2017-07-14 NOTE — CONS
Date/Time of Note


Date/Time of Note


DATE: 7/14/17 


TIME: 17:12





Assessment/Plan


Assessment/Plan


Problems:  


(1) Mass of left side of neck


Status:  Acute


Comment:  The patient has been seen by ENT reportedly although the notes are 

not in the chart at this moment.  I am still somewhat concerned this may 

represent infectious etiology given the history that it is decreased in size 

while receiving IV antibiotic therapy.  I will go ahead on my own and give the 

patient antibiotics to go home with however I am in concurrence she does need 

to follow-up with Dr. Cabrera in the coming week and have arrangements for an 

ultrasound-guided percutaneous biopsy of the lymph node to see what is going on 

here to make sure were not dealing with a rapid early recurrence of this 

papillary carcinoma of the thyroid.  Please note other things can also appear 

in these settings and we have not ruled that out either.





(2) Papillary thyroid carcinoma


Status:  Chronic


Comment:  Continue suppressive therapy.  The patient does not wish to continue 

with the desiccated thyroid and I will switch her over to a combination of 

levothyroxine with liothyronine.  This is not the most typical approach this 

but as long as the TSH is suppressed it would be appropriate





(3) Status post thyroid surgery


Onset Date:  ~ 9/30/2016      Status:  Chronic


Comment:  This was performed by Dr. Tricia Rosas he is following the patient





(4) Obesity


Status:  Chronic


Comment:  Noted


Qualifiers:  


   


(5) Status post laparoscopic cholecystectomy


Onset Date:  ~ 12/30/2012      Status:  Chronic


Comment:  Noted





(6) Status post tubal ligation


Status:  Chronic


Comment:  Noted








Consultation Date/Type/Reason


Admit Date/Time


Jul 12, 2017 at 19:00


Date of Consultation:  Jul 13, 2017


Type of Consultation:  Endocrinology


Reason for Consultation


Papillary carcinoma thyroid; enlarged left neck mass abruptly with tenderness


Referring Provider:  TALHA BROWN of Present Illness


Pleasant 31-year-old  female who in September 2016 was surgically 

treated for papillary carcinoma thyroid.  At that time she had presented with a 

tumor with a maximum dimension less than 2 cm but had 3 positive lymph nodes.  

She was subsequently treated with radioactive iodine ablation at 166 mCi 

roughly 2 months later.  Had been on suppressive therapy with adequately 

suppressed TSH.  She reports she was not necessarily feeling ideally and had 

questions about whether or not she had some type of adrenal dysfunction or the 

medications were just not making her feel perfectly.  She developed left neck 

swelling with tenderness and what was described as cellulitis.  She was seen in 

the emergency room at [facility] given a dose of antibiotics 

and transferred to this facility.  She has been on antibiotics here and reports 

a decrease in the swelling but his tenderness is relatively the same.  While 

there is no written note in the computer record I am told by the patient and by 

the primary team that the ear nose and throat physician is concerned this may 

represent recurrent thyroid cancer and has recommended an outpatient biopsy to 

be arranged by her endocrinologist; Dr. Cabrera


Constitutional:  no complaints (Denies fevers chills or sweats)


ENT:  no complaints


Respiratory:  no complaints


Cardiovascular:  no complaints


Gastrointestinal:  no complaints


Genitourinary:  no complaints


Musculoskeletal:  no complaints





Past Medical History


Papillary carcinoma thyroid





Past Surgical History


Status post thyroidectomy


Past Surgical Hx:  cholecystectomy, other





Family History


Significant Family History:  no pertinent family hx





Social History


Alcohol Use:  none


Smoking Status:  Never smoker


Drug Use:  none





Exam/Review of Systems


Vital Signs


Vitals





 Vital Signs








  Date Time  Temp Pulse Resp B/P Pulse Ox O2 Delivery O2 Flow Rate FiO2


 


7/14/17 08:37 98.7 82 16 119/67 98   














 Intake and Output   


 


 7/13/17 7/13/17 7/14/17





 15:00 23:00 07:00


 


Intake Total 600 ml 2240 ml 1730 ml


 


Balance 600 ml 2240 ml 1730 ml











Exam


Pleasant vibrant  female no nancy distress


Constitutional:  alert, oriented


Psych:  anxiety


Head:  atraumatic, normocephalic


Eyes:  EOMI, nl conjunctiva, nl lids


Neck:  masses (Left neck mass greater than 5 cm see CT report), supple


Respiratory:  clear to auscultation, normal air movement


Cardiovascular:  nl pulses, regular rate and rhythm





Results


Result Diagram:  


7/14/17 0108 7/14/17 0108





Results 24 hrs





Laboratory Tests








Test


  7/13/17


22:05 7/13/17


22:35 7/13/17


23:10 7/14/17


01:08


 


Random Cortisol 3.4   21.8   27.9   


 


White Blood Count    8.0  #


 


Red Blood Count    3.87  L


 


Hemoglobin    11.9  L


 


Hematocrit    34.9  L


 


Mean Corpuscular Volume    90.2  


 


Mean Corpuscular Hemoglobin    30.7  


 


Mean Corpuscular Hemoglobin


Concent 


  


  


  34.1  


 


 


Red Cell Distribution Width    11.0  L


 


Platelet Count    292  


 


Mean Platelet Volume    10.1  


 


Neutrophils %    82.6  H


 


Lymphocytes %    9.1  L


 


Monocytes %    6.3  


 


Eosinophils %    1.1  


 


Basophils %    0.5  


 


Nucleated Red Blood Cells %    0.0  


 


Neutrophils #    6.6  


 


Lymphocytes #    0.7  L


 


Monocytes #    0.5  


 


Eosinophils #    0.1  


 


Basophils #    0.0  


 


Nucleated Red Blood Cells #    0.0  


 


Sodium Level    132  L


 


Potassium Level    3.5  


 


Chloride Level    105  


 


Carbon Dioxide Level    24  


 


Anion Gap    7  L


 


Blood Urea Nitrogen    9  


 


Creatinine    0.67  


 


Glucose Level    103  


 


Calcium Level    8.9  


 


Total Bilirubin    0.4  


 


Direct Bilirubin    0.00  


 


Indirect Bilirubin    0.4  


 


Aspartate Amino Transf


(AST/SGOT) 


  


  


  35  


 


 


Alanine Aminotransferase


(ALT/SGPT) 


  


  


  58  


 


 


Alkaline Phosphatase    103  


 


Total Protein    6.5  


 


Albumin    4.0  


 


Globulin    2.50  


 


Albumin/Globulin Ratio    1.60  


 


Thyroid Stimulating Hormone


(TSH) 


  


  


  0.032  L


 


 


Free Thyroxine    1.32  


 


Free Triiodothyronine (T3)


pg/mL 


  


  


  5.02  


 


 


Vancomycin Level Trough    10.4  











Medications


Medications





 Current Medications


Sodium Chloride (NS) 1,000 ml @  100 mls/hr Q10H IV  Last administered on 7/14/ 17at 01:07; Admin Dose 100 MLS/HR;  Start 7/12/17 at 20:41


Ondansetron HCl (Zofran Inj) 4 mg Q6H  PRN IV NAUSEA AND/OR VOMITING;  Start 7/ 12/17 at 21:00


Morphine Sulfate 2 mg 2 mg Q4H  PRN IV SEVERE PAIN LEVEL 7-10 Last administered 

on 7/12/17at 21:27; Admin Dose 2 MG;  Start 7/12/17 at 21:00


Ampicillin Sodium/ Sulbactam Sodium (Unasyn 1.5gm/NS (Pmx)) 50 ml @  100 mls/hr 

Q6 IVPB  Last administered on 7/14/17at 11:42; Admin Dose 100 MLS/HR;  Start 7/ 13/17 at 00:00


Ketorolac Tromethamine 15 mg 15 mg Q6H  PRN IV PAIN Last administered on 7/14/ 17at 11:42; Admin Dose 15 MG;  Start 7/13/17 at 00:00;  Stop 7/14/17 at 23:59


Vancomycin HCl (Vancocin) 250 ml @  125 mls/hr Q8H IVPB  Last administered on 7/ 14/17at 10:13; Admin Dose 125 MLS/HR;  Start 7/13/17 at 10:00


Diphenhydramine HCl (Benadryl) 25 mg Q6H  PRN PO ITCHING Last administered on 7/ 13/17at 10:42; Admin Dose 25 MG;  Start 7/13/17 at 10:00


Pantoprazole (Protonix Tab) 40 mg 06,18 PO  Last administered on 7/14/17at 06:12

; Admin Dose 40 MG;  Start 7/13/17 at 18:00


Prochlorperazine (Compazine) 5 mg Q6H  PRN PO NAUSEA AND/OR VOMITING;  Start 7/ 13/17 at 14:00


Liothyronine Sodium (Cytomel) 5 mcg BID PO  Last administered on 7/14/17at 08:21

; Admin Dose 5 MCG;  Start 7/13/17 at 21:00


Levothyroxine Sodium (Synthroid) 150 mcg DAILY@06 PO  Last administered on 7/14/ 17at 06:12; Admin Dose 150 MCG;  Start 7/14/17 at 06:00


Duloxetine HCl (Cymbalta) 60 mg HS PO  Last administered on 7/13/17at 23:18; 

Admin Dose 60 MG;  Start 7/13/17 at 23:00











NATHAN MARTINEZ MD Jul 14, 2017 17:23

## 2017-07-14 NOTE — DS
Date/Time of Note


Date/Time of Note


DATE: 7/14/17 


TIME: 15:07





Discharge Summary


Admission/Discharge Info


Admit Date/Time


Jul 12, 2017 at 19:00


Discharge Date/Time


7/14/17


Discharge Diagnosis





#1 neck cellulitis: There is no CT evidence of abscess or fluid collection.  


   S/P  IV vancomycin and Unasyn.  


   Patient is speaking and swallowing without any difficulty will put her on a 

regular diet at this time.  


   Toradol for pain.  


   ENT has been consulted and he does not feel that pt has an infection and 

could be discharged and F/U with Endo for a Bx considering Hx of Throid CA





#2 hypothyroidism: Patient is status post thyroidectomy secondary to her 

thyroid cancer.  


   Her TSH done at the outside facility was 0.07.  


   FT4  is nl


   Patient has an appointment with her endocrinologist in the coming weeks.  


   At the current time we will continue her home dose of thyroid medication





#3 depression: Cont home dose of Cymbalta


Patient Condition:  Good


Consults


ENT


Hx of Present Illness


Chief complaint: Left neck pain 3 days





This is a 31-year-old female who was transferred from [facility].  She presented over there with swelling on the left side of her neck 

for approximately 3 days.  She has been very concerned about this.  Because she 

was nervous and anxious about that she had 2 episodes of hyperventilation.  She 

denies any fevers.  She denies any difficulty swallowing at that time or chest 

pain or shortness of breath.  She has a previous history of thyroid cancer for 

which she received surgical treatment as well as ablation therapy.  She was 

diagnosed there with a left skin infection after undergoing a CAT scan.  She 

was given Unasyn. 


Upon my examination at [facility] patient is lying 

comfortably in bed in no acute distress.  She does have noted swelling on the 

left side of her neck.  It is tender to palpation.  She is very pleasant patient





Allergies: Meperidine, Reglan, nickel, shellfish





Medications: See MAR


Hospital Course


Pt is a 30 yo F with a Hx of Thyroid CA s/p resection who p/w Neck pain and 

swelling was given ABx and was seen by ENT who believe that this may not be an 

infection but needs a Bx as an out-pt with her Endo. There was no CT evidence 

of abscess or fluid collection.  Pt does have am appt with Endo. On day of DC 

vitals, labs and PE stable.


Home Meds


Active Scripts


Pantoprazole* (Protonix*) 40 Mg Tablet.dr, 40 MG PO BID, #20 TAB


   Prov:QUIN MIRAMONTES PA-C         1/13/17


Prochlorperazine* (Prochlorperazine*) 10 Mg Tablet, 5 MG PO Q6 Y for NAUSEA AND/

OR VOMITING, #12 TAB


   Prov:QUIN MIRAMONTES PA-C         1/13/17


Reported Medications


Thyroid,Pork (NP THYROID) 90 Mg Tablet, 90 MG PO, TAB


   7/13/17


Duloxetine Hcl* (Cymbalta*) 60 Mg Capsule.dr, 60 MG PO DAILY, CAP


   7/13/17


Primary Care Provider


Not On Staff Doctor


Time spent on discharge:   > 30 minutes


Pending Labs





Laboratory Tests








Test


  7/13/17


22:05 7/13/17


22:35 7/13/17


23:10 7/14/17


01:08


 


Random Cortisol 3.4ug/dl  21.8ug/dl  27.9ug/dl  


 


White Blood Count


  


  


  


  8.010^3/ul


(4.8-10.8)


 


Red Blood Count


  


  


  


  3.8710^6/ul


(4.20-5.40)


 


Hemoglobin


  


  


  


  11.9g/dl


(12.0-16.0)


 


Hematocrit


  


  


  


  34.9%


(37.0-47.0)


 


Mean Corpuscular Volume


  


  


  


  90.2fl


(82.0-101.0)


 


Mean Corpuscular Hemoglobin


  


  


  


  30.7pg


(29.0-33.0)


 


Mean Corpuscular Hemoglobin


Concent 


  


  


  34.1g/dl


(32.0-37.0)


 


Red Cell Distribution Width


  


  


  


  11.0%


(11.5-14.5)


 


Platelet Count


  


  


  


  41996^3/UL


(140-415)


 


Mean Platelet Volume


  


  


  


  10.1fl


(7.4-10.4)


 


Neutrophils %


  


  


  


  82.6%


(39.0-77.0)


 


Lymphocytes %


  


  


  


  9.1%


(15.0-51.0)


 


Monocytes %


  


  


  


  6.3%


(0.0-11.0)


 


Eosinophils %    1.1% (0.0-7.0) 


 


Basophils %    0.5% (0.0-2.0) 


 


Nucleated Red Blood Cells %


  


  


  


  0.0/100WBC


(0.0-0.0)


 


Neutrophils #


  


  


  


  6.610^3/ul


(1.6-7.5)


 


Lymphocytes #


  


  


  


  0.710^3/ul


(0.8-2.9)


 


Monocytes #


  


  


  


  0.510^3/ul


(0.3-0.9)


 


Eosinophils #


  


  


  


  0.110^3/ul


(0.0-0.5)


 


Basophils #


  


  


  


  0.010^3/ul


(0.0-0.1)


 


Nucleated Red Blood Cells #


  


  


  


  0.010^3/ul


(0.0-0.0)


 


Sodium Level


  


  


  


  132mmol/L


(135-144)


 


Potassium Level


  


  


  


  3.5mmol/L


(3.5-5.1)


 


Chloride Level


  


  


  


  105mmol/L


()


 


Carbon Dioxide Level


  


  


  


  24mmol/L


(21-31)


 


Anion Gap    7 (8-16) 


 


Blood Urea Nitrogen    9mg/dl (7-20) 


 


Creatinine


  


  


  


  0.67mg/dl


(0.44-1.00)


 


Glucose Level


  


  


  


  103mg/dl


()


 


Calcium Level


  


  


  


  8.9mg/dl


(8.4-10.2)


 


Total Bilirubin


  


  


  


  0.4mg/dl


(0.2-1.3)


 


Direct Bilirubin


  


  


  


  0.00mg/dl


(0.00-0.20)


 


Indirect Bilirubin


  


  


  


  0.4mg/dl


(0-1.1)


 


Aspartate Amino Transf


(AST/SGOT) 


  


  


  35IU/L (15-46) 


 


 


Alanine Aminotransferase


(ALT/SGPT) 


  


  


  58IU/L (13-69) 


 


 


Alkaline Phosphatase


  


  


  


  103IU/L


()


 


Total Protein


  


  


  


  6.5g/dl


(6.1-8.1)


 


Albumin


  


  


  


  4.0g/dl


(3.3-4.9)


 


Globulin


  


  


  


  2.50g/dl


(1.3-3.2)


 


Albumin/Globulin Ratio    1.60 


 


Thyroid Stimulating Hormone


(TSH) 


  


  


  0.032MIU/L


(0.465-4.680)


 


Free Thyroxine


  


  


  


  1.32ng/dl


(0.79-2.35)


 


Free Triiodothyronine (T3)


pg/mL 


  


  


  5.02pg/ml


(2.77-5.27)


 


Vancomycin Level Trough


  


  


  


  10.4ug/ml


(10.0-20.0)

















TALHA BROWN Jul 14, 2017 15:14

## 2017-07-17 NOTE — CONS
Date/Time of Note


Date/Time of Note


DATE: 7/17/17 


TIME: 13:48





Assessment/Plan


Assessment/Plan


Additional Assessment/Plan


Reactive LAD of an infectious etiology or metastatic LAD.  Recommend outpatient 

oral abx therapy and outpatient FNAB.


- Mgmt thereafter based on FNAB results





Consultation Date/Type/Reason


Admit Date/Time


Jul 12, 2017 at 19:00


Date of Consultation:  Jul 14, 2017


Type of Consultation:  ENT


Reason for Consultation


Neck pain and swelling


Referring Provider:  ROBIN SUAREZ





Hx of Present Illness


Hx of thyroid cancer s/p thyroidectomy.  Hx of I-131 therapy.  P/w 1 week of 

increasing left neck pain and swelling.  No improvement with IV abx over 3 

days.  Denies dyspnea, odynophagia or throat symptoms.


Constitutional:  no complaints (Denies fevers chills or sweats)


ENT:  no complaints


Respiratory:  no complaints


Cardiovascular:  no complaints


Gastrointestinal:  no complaints


Genitourinary:  no complaints


Musculoskeletal:  no complaints


Psychological:  anxiety





Past Surgical History


Past Surgical Hx:  cholecystectomy, other





Social History


Alcohol Use:  none


Smoking Status:  Never smoker


Drug Use:  none





Exam/Review of Systems


Vital Signs


Vitals





 Vital Signs








  Date Time  Temp Pulse Resp B/P Pulse Ox O2 Delivery O2 Flow Rate FiO2


 


7/14/17 08:37 98.7 82 16 119/67 98   











Exam


Neck:  other (Edema and mild tenderness of left neck. No erythema)





Results


Result Diagram:  


7/14/17 0108 7/14/17 0108














ALEX RUSS MD Jul 17, 2017 13:52

## 2018-09-12 ENCOUNTER — HOSPITAL ENCOUNTER (OUTPATIENT)
Dept: HOSPITAL 91 - NUC | Age: 33
Discharge: HOME | End: 2018-09-12
Payer: COMMERCIAL

## 2018-09-12 ENCOUNTER — HOSPITAL ENCOUNTER (OUTPATIENT)
Age: 33
Discharge: HOME | End: 2018-09-12

## 2018-09-12 DIAGNOSIS — C73: Primary | ICD-10-CM

## 2018-09-12 PROCEDURE — 78018 THYROID MET IMAGING BODY: CPT

## 2020-09-01 ENCOUNTER — RX ONLY (OUTPATIENT)
Age: 35
Setting detail: RX ONLY
End: 2020-09-01

## 2024-02-23 ENCOUNTER — RX ONLY (OUTPATIENT)
Age: 39
Setting detail: RX ONLY
End: 2024-02-23

## 2024-03-11 ENCOUNTER — RX ONLY (OUTPATIENT)
Age: 39
Setting detail: RX ONLY
End: 2024-03-11

## 2024-04-03 ENCOUNTER — APPOINTMENT (RX ONLY)
Dept: URBAN - METROPOLITAN AREA CLINIC 47 | Facility: CLINIC | Age: 39
Setting detail: DERMATOLOGY
End: 2024-04-03

## 2024-04-03 DIAGNOSIS — Z71.89 OTHER SPECIFIED COUNSELING: ICD-10-CM

## 2024-04-03 DIAGNOSIS — L90.6 STRIAE ATROPHICAE: ICD-10-CM

## 2024-04-03 DIAGNOSIS — L81.4 OTHER MELANIN HYPERPIGMENTATION: ICD-10-CM

## 2024-04-03 DIAGNOSIS — D22 MELANOCYTIC NEVI: ICD-10-CM

## 2024-04-03 PROBLEM — D48.5 NEOPLASM OF UNCERTAIN BEHAVIOR OF SKIN: Status: ACTIVE | Noted: 2024-04-03

## 2024-04-03 PROBLEM — D22.9 MELANOCYTIC NEVI, UNSPECIFIED: Status: ACTIVE | Noted: 2024-04-03

## 2024-04-03 PROCEDURE — ? COUNSELING

## 2024-04-03 PROCEDURE — 11104 PUNCH BX SKIN SINGLE LESION: CPT

## 2024-04-03 PROCEDURE — ? BIOPSY BY PUNCH METHOD

## 2024-04-03 PROCEDURE — 99203 OFFICE O/P NEW LOW 30 MIN: CPT | Mod: 25

## 2024-04-03 PROCEDURE — ? SUNSCREEN RECOMMENDATIONS

## 2024-04-03 ASSESSMENT — LOCATION ZONE DERM: LOCATION ZONE: TRUNK

## 2024-04-03 ASSESSMENT — LOCATION SIMPLE DESCRIPTION DERM: LOCATION SIMPLE: RIGHT BREAST

## 2024-04-03 ASSESSMENT — LOCATION DETAILED DESCRIPTION DERM: LOCATION DETAILED: RIGHT MEDIAL BREAST 4-5:00 REGION

## 2024-04-03 NOTE — PROCEDURE: BIOPSY BY PUNCH METHOD
Body Location Override (Optional - Billing Will Still Be Based On Selected Body Map Location If Applicable): right medial breast
Detail Level: Detailed
Was A Bandage Applied: Yes
Punch Size In Mm: 4
Size Of Lesion In Cm (Optional): 0
Depth Of Punch Biopsy: dermis
Biopsy Type: H and E
Anesthesia Type: 2% lidocaine without epinephrine
Anesthesia Volume In Cc: 0.5
Hemostasis: Ligature
Epidermal Sutures: 4-0 Nylon
Wound Care: Bacitracin
Dressing: Band-Aid
Suture Removal: 14 days
Patient Will Remove Sutures At Home?: No
Lab: [id number]
Lab Facility: [id number]
Path Notes (To The Dermatopathologist): Size: 4mm punch \\nR/O: Striae Distensae vs NUB
Consent: Written consent was obtained and risks were reviewed including but not limited to scarring, infection, bleeding, scabbing, incomplete removal, nerve damage and allergy to anesthesia.
Post-Care Instructions: I reviewed with the patient in detail post-care instructions. Patient is to keep the biopsy site dry overnight, and then apply bacitracin twice daily until healed. Patient may apply hydrogen peroxide soaks to remove any crusting.
Home Suture Removal Text: Patient was provided a home suture removal kit and will remove their sutures at home.  If they have any questions or difficulties they will call the office.
Notification Instructions: Patient will be notified of biopsy results. However, patient instructed to call the office if not contacted within 2 weeks.
Billing Type: Third-Party Bill
Information: Selecting Yes will display possible errors in your note based on the variables you have selected. This validation is only offered as a suggestion for you. PLEASE NOTE THAT THE VALIDATION TEXT WILL BE REMOVED WHEN YOU FINALIZE YOUR NOTE. IF YOU WANT TO FAX A PRELIMINARY NOTE YOU WILL NEED TO TOGGLE THIS TO 'NO' IF YOU DO NOT WANT IT IN YOUR FAXED NOTE.

## 2024-04-03 NOTE — PROCEDURE: COUNSELING
Patient Specific Counseling (Will Not Stick From Patient To Patient): Patient educated
Detail Level: Generalized
Detail Level: Detailed
Detail Level: Zone

## 2024-04-03 NOTE — PROCEDURE: SUNSCREEN RECOMMENDATIONS

## 2024-04-16 ENCOUNTER — APPOINTMENT (RX ONLY)
Dept: URBAN - METROPOLITAN AREA CLINIC 47 | Facility: CLINIC | Age: 39
Setting detail: DERMATOLOGY
End: 2024-04-16

## 2024-04-16 DIAGNOSIS — Z48.817 ENCOUNTER FOR SURGICAL AFTERCARE FOLLOWING SURGERY ON THE SKIN AND SUBCUTANEOUS TISSUE: ICD-10-CM

## 2024-04-16 PROCEDURE — ? PRESCRIPTION

## 2024-04-16 PROCEDURE — 99212 OFFICE O/P EST SF 10 MIN: CPT

## 2024-04-16 PROCEDURE — ? COUNSELING

## 2024-04-16 RX ORDER — CEPHALEXIN 500 MG/1
CAPSULE ORAL
Qty: 10 | Refills: 0 | Status: ERX | COMMUNITY
Start: 2024-04-16

## 2024-04-16 RX ORDER — MUPIROCIN 20 MG/G
OINTMENT TOPICAL TID
Qty: 22 | Refills: 0 | Status: ERX | COMMUNITY
Start: 2024-04-16

## 2024-04-16 RX ADMIN — CEPHALEXIN: 500 CAPSULE ORAL at 00:00

## 2024-04-16 RX ADMIN — MUPIROCIN: 20 OINTMENT TOPICAL at 00:00

## 2024-04-17 ENCOUNTER — APPOINTMENT (RX ONLY)
Dept: URBAN - METROPOLITAN AREA CLINIC 47 | Facility: CLINIC | Age: 39
Setting detail: DERMATOLOGY
End: 2024-04-17

## 2024-04-17 DIAGNOSIS — Q828 OTHER SPECIFIED ANOMALIES OF SKIN: ICD-10-CM

## 2024-04-17 DIAGNOSIS — D18.0 HEMANGIOMA: ICD-10-CM

## 2024-04-17 DIAGNOSIS — D22 MELANOCYTIC NEVI: ICD-10-CM

## 2024-04-17 DIAGNOSIS — Z71.89 OTHER SPECIFIED COUNSELING: ICD-10-CM

## 2024-04-17 DIAGNOSIS — L57.0 ACTINIC KERATOSIS: ICD-10-CM

## 2024-04-17 DIAGNOSIS — Q819 OTHER SPECIFIED ANOMALIES OF SKIN: ICD-10-CM

## 2024-04-17 DIAGNOSIS — Q826 OTHER SPECIFIED ANOMALIES OF SKIN: ICD-10-CM

## 2024-04-17 DIAGNOSIS — B35.4 TINEA CORPORIS: ICD-10-CM

## 2024-04-17 PROBLEM — L85.8 OTHER SPECIFIED EPIDERMAL THICKENING: Status: ACTIVE | Noted: 2024-04-17

## 2024-04-17 PROBLEM — D22.9 MELANOCYTIC NEVI, UNSPECIFIED: Status: ACTIVE | Noted: 2024-04-17

## 2024-04-17 PROBLEM — D18.01 HEMANGIOMA OF SKIN AND SUBCUTANEOUS TISSUE: Status: ACTIVE | Noted: 2024-04-17

## 2024-04-17 PROCEDURE — 17000 DESTRUCT PREMALG LESION: CPT

## 2024-04-17 PROCEDURE — ? PRESCRIPTION

## 2024-04-17 PROCEDURE — ? COUNSELING

## 2024-04-17 PROCEDURE — 99213 OFFICE O/P EST LOW 20 MIN: CPT | Mod: 25

## 2024-04-17 PROCEDURE — ? TREATMENT REGIMEN

## 2024-04-17 PROCEDURE — ? LIQUID NITROGEN

## 2024-04-17 PROCEDURE — ? SUNSCREEN RECOMMENDATIONS

## 2024-04-17 RX ORDER — NYSTATIN 100000 [USP'U]/G
POWDER TOPICAL QD
Qty: 60 | Refills: 0 | Status: ERX | COMMUNITY
Start: 2024-04-17

## 2024-04-17 RX ADMIN — NYSTATIN: 100000 POWDER TOPICAL at 00:00

## 2024-04-17 ASSESSMENT — LOCATION SIMPLE DESCRIPTION DERM
LOCATION SIMPLE: RIGHT UPPER ARM
LOCATION SIMPLE: LEFT UPPER ARM
LOCATION SIMPLE: RIGHT THIGH
LOCATION SIMPLE: LEFT THIGH
LOCATION SIMPLE: RIGHT CHEEK
LOCATION SIMPLE: LEFT PRETIBIAL REGION
LOCATION SIMPLE: RIGHT PRETIBIAL REGION

## 2024-04-17 ASSESSMENT — LOCATION DETAILED DESCRIPTION DERM
LOCATION DETAILED: LEFT ANTERIOR PROXIMAL THIGH
LOCATION DETAILED: LEFT ANTERIOR DISTAL UPPER ARM
LOCATION DETAILED: RIGHT CENTRAL MALAR CHEEK
LOCATION DETAILED: RIGHT ANTERIOR PROXIMAL THIGH
LOCATION DETAILED: LEFT PROXIMAL PRETIBIAL REGION
LOCATION DETAILED: RIGHT ANTERIOR PROXIMAL UPPER ARM
LOCATION DETAILED: RIGHT PROXIMAL PRETIBIAL REGION

## 2024-04-17 ASSESSMENT — LOCATION ZONE DERM
LOCATION ZONE: LEG
LOCATION ZONE: FACE
LOCATION ZONE: ARM

## 2024-04-17 NOTE — PROCEDURE: LIQUID NITROGEN
Number Of Freeze-Thaw Cycles: 2 freeze-thaw cycles
Detail Level: Simple
Render Post-Care Instructions In Note?: no
Show Aperture Variable?: Yes
Duration Of Freeze Thaw-Cycle (Seconds): 6
Consent: The patient's consent was obtained including but not limited to risks of crusting, scabbing, blistering, scarring, darker or lighter pigmentary change, recurrence, incomplete removal and infection.
Post-Care Instructions: I reviewed with the patient in detail post-care instructions. Patient is to wear sunprotection, and avoid picking at any of the treated lesions. Pt may apply Vaseline to crusted or scabbing areas.

## 2024-05-30 ENCOUNTER — APPOINTMENT (RX ONLY)
Dept: URBAN - METROPOLITAN AREA CLINIC 47 | Facility: CLINIC | Age: 39
Setting detail: DERMATOLOGY
End: 2024-05-30

## 2024-05-30 DIAGNOSIS — L91.0 HYPERTROPHIC SCAR: ICD-10-CM

## 2024-05-30 DIAGNOSIS — L57.0 ACTINIC KERATOSIS: ICD-10-CM

## 2024-05-30 PROCEDURE — ? COUNSELING

## 2024-05-30 PROCEDURE — 11900 INJECT SKIN LESIONS </W 7: CPT | Mod: 59

## 2024-05-30 PROCEDURE — ? INTRALESIONAL KENALOG

## 2024-05-30 PROCEDURE — 17000 DESTRUCT PREMALG LESION: CPT

## 2024-05-30 PROCEDURE — ? LIQUID NITROGEN

## 2024-05-30 ASSESSMENT — LOCATION DETAILED DESCRIPTION DERM
LOCATION DETAILED: RIGHT MEDIAL BREAST 3-4:00 REGION
LOCATION DETAILED: RIGHT INFERIOR CENTRAL MALAR CHEEK

## 2024-05-30 ASSESSMENT — LOCATION SIMPLE DESCRIPTION DERM
LOCATION SIMPLE: RIGHT CHEEK
LOCATION SIMPLE: RIGHT BREAST

## 2024-05-30 ASSESSMENT — LOCATION ZONE DERM
LOCATION ZONE: TRUNK
LOCATION ZONE: FACE

## 2024-11-26 ENCOUNTER — APPOINTMENT (RX ONLY)
Dept: URBAN - METROPOLITAN AREA CLINIC 47 | Facility: CLINIC | Age: 39
Setting detail: DERMATOLOGY
End: 2024-11-26

## 2024-11-26 DIAGNOSIS — B00.1 HERPESVIRAL VESICULAR DERMATITIS: ICD-10-CM

## 2024-11-26 PROCEDURE — ? COUNSELING

## 2024-11-26 PROCEDURE — 99213 OFFICE O/P EST LOW 20 MIN: CPT

## 2024-11-26 PROCEDURE — ? PRESCRIPTION

## 2024-11-26 PROCEDURE — ? TREATMENT REGIMEN

## 2024-11-26 RX ORDER — VALACYCLOVIR HYDROCHLORIDE 1 G/1
TABLET, FILM COATED ORAL
Qty: 20 | Refills: 2 | Status: ERX | COMMUNITY
Start: 2024-11-26

## 2024-11-26 RX ADMIN — VALACYCLOVIR HYDROCHLORIDE: 1 TABLET, FILM COATED ORAL at 00:00

## 2024-11-26 NOTE — PROCEDURE: TREATMENT REGIMEN
Initiate Treatment: Valtrex 1 gram tablet \\nTake two tablets PO BID for 1 day #20
Detail Level: Zone
Fall Risk

## 2025-07-09 NOTE — HP
Date/Time of Note


Date/Time of Note


DATE: 7/12/17 


TIME: 23:48





Assessment/Plan


VTE Prophylaxis


VTE Prophylaxis Intervention:  SCD's





Lines/Catheters


IV Catheter Type (from [facility]):  Peripheral IV





Assessment/Plan


Chief Complaint/Hosp Course


This is a 31 year female being admitted to the [facility] floor for:





#1 neck cellulitis: There is no CT evidence of abscess or fluid collection.  At 

the current time we will treat with IV vancomycin and Unasyn.  Patient is 

speaking and swallowing without any difficulty will put her on a regular diet 

at this time.  Toradol for pain.  Will consult ENT.





#2 hypothyroidism: Patient is status post thyroidectomy secondary to her 

thyroid cancer.  Her TSH done at the outside facility was 0.07.  She has an 

appointment with her endocrinologist in the coming weeks.  At the current time 

we will continue her home dose of thyroid medication.  If indicated will 

consult endocrinology.





#3 depression: We will continue patient's home dose of Cymbalta.





#4 DVT and GI prophylaxis: SCDs, Protonix





Further treatment strategy will be implemented as per the clinical course


Problems:  





HPI/ROS


Admit Date/Time


Admit Date/Time


Jul 12, 2017 at 19:00





Hx of Present Illness


Chief complaint: Left neck pain 3 days





This is a 31-year-old female who was transferred from [facility].  She presented over there with swelling on the left side of her neck 

for approximately 3 days.  She has been very concerned about this.  Because she 

was nervous and anxious about that she had 2 episodes of hyperventilation.  She 

denies any fevers.  She denies any difficulty swallowing at that time or chest 

pain or shortness of breath.  She has a previous history of thyroid cancer for 

which she received surgical treatment as well as ablation therapy.  She was 

diagnosed there with a left skin infection after undergoing a CAT scan.  She 

was given Unasyn. 


Upon my examination at [facility] patient is lying 

comfortably in bed in no acute distress.  She does have noted swelling on the 

left side of her neck.  It is tender to palpation.  She is very pleasant patient





Allergies: Meperidine, Reglan, nickel, shellfish





Medications: See MAR





ROS


Const: As per HPI


Eyes : No pain discharge or redness or change in visual acuity


ENT: As per HPI


Respiratory: No shortness of breath, cough, sputum, wheezing, or pleuritic pain


Cardiovascular: No chest pain, palpitation, PND, or edema


GI : no change in appetite, abdominal pain, nausea, vomiting, diarrhea, 

constipation, or change in the color his stool 


Genitourinary: No dysuria, hematuria, flank pain ,  discharge or CVA tenderness


Musculoskeletal: No joint pain, back pain, neck pain, restricted range of 

motion in neck or joints


Skin: No rash, bruising or hives 


Neuro: No headache, dizziness, syncope, seizure, focal weakness


Endocrine: No polyuria, polydipsia, temperature intolerance


Psych: No hallucination, depression, anxiety or suicidal ideation





PMH/Family/Social


Past Medical History


Papillary thyroid cancer, breast tumor, depression, hypothyroidism





Past Surgical History


Thyroidectomy, lumpectomy


Past Surgical Hx:  cholecystectomy, other





Family History


Significant Family History:  cancer (Thyroid cancer: Grandpa), hypertension





Social History


Alcohol Use:  none


Smoking Status:  Never smoker


Drug Use:  none





Exam/Review of Systems


Vital Signs


Vitals





 Vital Signs








  Date Time  Temp Pulse Resp B/P Pulse Ox O2 Delivery O2 Flow Rate FiO2


 


7/12/17 19:00 98.2 94 19 131/74 99   











Exam


Exam





General: Patient is a pleasant 31-year-old female laying in bed in no acute 

distress.


HEENT: Normal cephalic atraumatic, pupils equal and reactive to light,


Neck: S tenderness to the left lateral neck around the area of the 

sternocleidomastoid muscle, mild warmth.


Chest: Nontender


Lungs: Clear to auscultation bilaterally no crackles rales or wheezing


Heart: Normal S1-S2, Regular rhythm and rate. No murmur, S3, or S4


Abdomen: Soft , nontender,  nondistended , bowel sounds are present. No 

guarding no rebound tenderness , No masses or organomegaly. No costovertebral 

temporal angle mass


Extremities: Normal to inspection, no edema no cyanosis


Neurologic: Normal mental status, speech normal, cranial nerves II through XII 

are intact, motor and sensory are intact, no focal weakness


Additional Comments


The following are the pertinent lab studies and imaging studies obtained at the 

transferring facility Kareem ordonez please refer to transfer documentation for 

further details.


White blood cell count 9.1 estimated  TSH 0.07 T4 6.8





CTA of the neck: Left neck deep cellulitis deep to the left sternal clot 

cleidomastoid muscle surrounding inflamed lymph nodes.  Lung neoplasm not 

excluded.  There is no focal left neck drainable fluid collection or abscess.  

Prior thyroidectomy.





Labs


Result Diagram:  


7/12/17 0656                                                                   

             7/12/17 2221








Medications


Medications





 Current Medications


Sodium Chloride (NS) 1,000 ml @  100 mls/hr Q10H IV  Last administered on 7/12/ 17at 21:20; Admin Dose 100 MLS/HR;  Start 7/12/17 at 20:41


Ondansetron HCl (Zofran Inj) 4 mg Q6H  PRN IV NAUSEA AND/OR VOMITING;  Start 7/ 12/17 at 21:00


Morphine Sulfate (morphine) 2 mg Q4H  PRN IV SEVERE PAIN LEVEL 7-10 Last 

administered on 7/12/17at 21:27; Admin Dose 2 MG;  Start 7/12/17 at 21:00


Pantoprazole 40 mg 40 mg DAILY@06 IV ;  Start 7/13/17 at 06:00


Ampicillin Sodium/ Sulbactam Sodium (Unasyn 1.5gm/NS (Pmx)) 50 ml @  100 mls/hr 

Q6 IVPB ;  Start 7/13/17 at 00:00;  Status UNV


Pantoprazole (Protonix Iv) 40 mg DAILY@06 IV ;  Start 7/13/17 at 06:00;  Status 

UNV











AMARILYS GAYTAN Jul 12, 2017 23:49
self-care/home management/community/leisure